# Patient Record
(demographics unavailable — no encounter records)

---

## 2025-07-25 NOTE — PHYSICAL EXAM
[Alert] : alert [No Acute Distress] : no acute distress [Normocephalic] : normocephalic [Anterior [place] Closed] : anterior fontanelle closed [Red Reflex Bilateral] : red reflex bilateral [PERRL] : PERRL [Normally Placed Ears] : normally placed ears [Auricles Well Formed] : auricles well formed [Clear Tympanic membranes with present light reflex and bony landmarks] : clear tympanic membranes with present light reflex and bony landmarks [No Discharge] : no discharge [Nares Patent] : nares patent [Palate Intact] : palate intact [Uvula Midline] : uvula midline [Tooth Eruption] : tooth eruption  [Supple, full passive range of motion] : supple, full passive range of motion [No Palpable Masses] : no palpable masses [Symmetric Chest Rise] : symmetric chest rise [Clear to Auscultation Bilaterally] : clear to auscultation bilaterally [Regular Rate and Rhythm] : regular rate and rhythm [S1, S2 present] : S1, S2 present [No Murmurs] : no murmurs [+2 Femoral Pulses] : +2 femoral pulses [Soft] : soft [NonTender] : non tender [Non Distended] : non distended [Normoactive Bowel Sounds] : normoactive bowel sounds [No Hepatomegaly] : no hepatomegaly [No Splenomegaly] : no splenomegaly [Andrew 1] : Andrew 1 [No Clitoromegaly] : no clitoromegaly [Normal Vaginal Introitus] : normal vaginal introitus [Patent] : patent [Normally Placed] : normally placed [No Abnormal Lymph Nodes Palpated] : no abnormal lymph nodes palpated [No Clavicular Crepitus] : no clavicular crepitus [Negative Antunez-Ortalani] : negative Antunez-Ortalani [Symmetric Buttocks Creases] : symmetric buttocks creases [No Spinal Dimple] : no spinal dimple [NoTuft of Hair] : no tuft of hair [Cranial Nerves Grossly Intact] : cranial nerves grossly intact [No Rash or Lesions] : no rash or lesions

## 2025-07-25 NOTE — HISTORY OF PRESENT ILLNESS
[Mother] : mother [Hours between feeds ___] : Child is fed every [unfilled] hours [Formula ___ oz/feed] : [unfilled] oz of formula per feed [Cow's milk ___ oz/feed] : [unfilled] oz of Cow's milk per feed [Fruit] : fruit [Vegetables] : vegetables [Meat] : meat [Dairy] : dairy [Finger food] : finger food [Table food] : table food [Vitamin ___] : Patient takes [unfilled] vitamin daily [___ stools per day] : [unfilled]  stools per day [Loose] : loose consistency [Normal] : Normal [On back] : On back [In crib] : In crib [Wakes up at night] : Wakes up at night [Pacifier use] : Pacifier use [Sippy cup use] : Sippy cup use [Brushing teeth] : Brushing teeth [Tap water] : Primary Fluoride Source: Tap water [Playtime] : Playtime  [No] : Not at  exposure [Water heater temperature set at <120 degrees F] : Water heater temperature set at <120 degrees F [Car seat in back seat] : Car seat in back seat [Smoke Detectors] : Smoke detectors [Carbon Monoxide Detectors] : Carbon monoxide detectors [NO] : No [Exposure to electronic nicotine delivery system] : No exposure to electronic nicotine delivery system [At risk for exposure to TB] : Not at risk for exposure to Tuberculosis [de-identified] : multivitamin [FreeTextEntry1] : no questions or concerns normal rashi loose stool afebrile no cough/congestion teething, drooling

## 2025-07-25 NOTE — DEVELOPMENTAL MILESTONES
[Looks for hidden objects] : looks for hidden objects [Imitates new gestures] : imitates new gestures [Says "Dad" or "Mom" with meaning] : says "Dad" or "Mom" with meaning [Uses one word other than Mom or] : uses one word other than Mom or Dad or personal names [Follows a verbal command that] : follows a verbal command that includes a gesture [Takes first independent] : takes first independent steps [Stands without support] : stands without support [Drops object in a cup] : drops object in a cup [Picks up small object with 2 finger] : picks up small object with 2 finger pincer grasp [Picks up food and eats it] : picks up food and eats it [Normal Development] : Normal Development [None] : none [Imitates scribbling] : imitates scribbling [Drinks from cup with little] : drinks from cup with little spilling [Points to ask for something] : points to ask for something or to get help [Uses 3 words other than names] : uses 3 words other than names [Speaks in sounds that seem like] : speaks in sounds that seem like an unknown language [Follows directions that do not] : follows direction that do not include a gesture [Looks when parent says,] : looks when parent says, "Where is...?" [Squats to  objects] : squats to  objects [Crawls up a few steps] : crawls up a few steps [Begins to run] : begins to run [Makes eros with crayon] : makes eros with guyyon [Drops object into and takes object] : drops object into and takes object out of container

## 2025-07-25 NOTE — DISCUSSION/SUMMARY
[Normal Growth] : growth [Normal Development] : development [None] : No known medical problems [No Elimination Concerns] : elimination [No Feeding Concerns] : feeding [No Skin Concerns] : skin [Normal Sleep Pattern] : sleep [No Medications] : ~He/She~ is not on any medications [Parent/Guardian] : parent/guardian [FreeTextEntry1] : Continue whole cow's milk. Continue table foods, 3 meals with 2-3 snacks per day. Incorporate flourinated water daily in a sippy cup. Brush teeth twice a day with soft toothbrush. Recommend visit to dentist. When in car, keep child in rear-facing car seats until age 2, or until  the maximum height and weight for seat is reached. Put baby to sleep in own crib. Lower crib matress. Help baby to maintain consistent daily routines and sleep schedule. Recognize stranger and separation anxiety. Ensure home is safe since baby is increasingly mobile. Be within arm's reach of baby at all times. Use consistent, positive discipline. Read aloud to baby.

## 2025-07-25 NOTE — HISTORY OF PRESENT ILLNESS
[Mother] : mother [Hours between feeds ___] : Child is fed every [unfilled] hours [Formula ___ oz/feed] : [unfilled] oz of formula per feed [Cow's milk ___ oz/feed] : [unfilled] oz of Cow's milk per feed [Fruit] : fruit [Vegetables] : vegetables [Meat] : meat [Dairy] : dairy [Finger food] : finger food [Table food] : table food [Vitamin ___] : Patient takes [unfilled] vitamin daily [___ stools per day] : [unfilled]  stools per day [Loose] : loose consistency [Normal] : Normal [On back] : On back [In crib] : In crib [Wakes up at night] : Wakes up at night [Pacifier use] : Pacifier use [Sippy cup use] : Sippy cup use [Brushing teeth] : Brushing teeth [Tap water] : Primary Fluoride Source: Tap water [Playtime] : Playtime  [No] : Not at  exposure [Water heater temperature set at <120 degrees F] : Water heater temperature set at <120 degrees F [Car seat in back seat] : Car seat in back seat [Smoke Detectors] : Smoke detectors [Carbon Monoxide Detectors] : Carbon monoxide detectors [NO] : No [Exposure to electronic nicotine delivery system] : No exposure to electronic nicotine delivery system [At risk for exposure to TB] : Not at risk for exposure to Tuberculosis [de-identified] : multivitamin [FreeTextEntry1] : no questions or concerns normal rashi loose stool afebrile no cough/congestion teething, drooling

## 2025-07-25 NOTE — PHYSICAL EXAM
[Alert] : alert [No Acute Distress] : no acute distress [Normocephalic] : normocephalic [Anterior [place] Closed] : anterior fontanelle closed [Red Reflex Bilateral] : red reflex bilateral [PERRL] : PERRL [Normally Placed Ears] : normally placed ears [Auricles Well Formed] : auricles well formed [Clear Tympanic membranes with present light reflex and bony landmarks] : clear tympanic membranes with present light reflex and bony landmarks [No Discharge] : no discharge [Nares Patent] : nares patent [Palate Intact] : palate intact [Uvula Midline] : uvula midline [Tooth Eruption] : tooth eruption  [Supple, full passive range of motion] : supple, full passive range of motion [No Palpable Masses] : no palpable masses [Symmetric Chest Rise] : symmetric chest rise [Clear to Auscultation Bilaterally] : clear to auscultation bilaterally [Regular Rate and Rhythm] : regular rate and rhythm [S1, S2 present] : S1, S2 present [No Murmurs] : no murmurs [+2 Femoral Pulses] : +2 femoral pulses [Soft] : soft [NonTender] : non tender [Non Distended] : non distended [Normoactive Bowel Sounds] : normoactive bowel sounds [No Hepatomegaly] : no hepatomegaly [No Splenomegaly] : no splenomegaly [Andrew 1] : Andrew 1 [No Clitoromegaly] : no clitoromegaly [Normal Vaginal Introitus] : normal vaginal introitus [Patent] : patent [Normally Placed] : normally placed [No Abnormal Lymph Nodes Palpated] : no abnormal lymph nodes palpated [No Clavicular Crepitus] : no clavicular crepitus [Negative Antunez-Ortalani] : negative Antunez-Ortalani [Symmetric Buttocks Creases] : symmetric buttocks creases [No Spinal Dimple] : no spinal dimple [NoTuft of Hair] : no tuft of hair [Cranial Nerves Grossly Intact] : cranial nerves grossly intact [No Rash or Lesions] : no rash or lesions